# Patient Record
Sex: FEMALE | Race: WHITE | HISPANIC OR LATINO | Employment: FULL TIME | URBAN - METROPOLITAN AREA
[De-identification: names, ages, dates, MRNs, and addresses within clinical notes are randomized per-mention and may not be internally consistent; named-entity substitution may affect disease eponyms.]

---

## 2017-10-04 ENCOUNTER — CONVERSION ENCOUNTER (OUTPATIENT)
Dept: URGENT CARE | Facility: CLINIC | Age: 37
End: 2017-10-04

## 2018-03-06 ENCOUNTER — OFFICE VISIT (OUTPATIENT)
Dept: OBSTETRICS AND GYNECOLOGY | Facility: CLINIC | Age: 38
End: 2018-03-06

## 2018-03-06 VITALS
HEART RATE: 90 BPM | WEIGHT: 126 LBS | HEIGHT: 61 IN | SYSTOLIC BLOOD PRESSURE: 122 MMHG | BODY MASS INDEX: 23.79 KG/M2 | DIASTOLIC BLOOD PRESSURE: 74 MMHG

## 2018-03-06 DIAGNOSIS — Z01.419 ENCOUNTER FOR GYNECOLOGICAL EXAMINATION WITHOUT ABNORMAL FINDING: Primary | ICD-10-CM

## 2018-03-06 DIAGNOSIS — Z30.432 ENCOUNTER FOR IUD REMOVAL: ICD-10-CM

## 2018-03-06 PROCEDURE — 58301 REMOVE INTRAUTERINE DEVICE: CPT | Performed by: OBSTETRICS & GYNECOLOGY

## 2018-03-06 PROCEDURE — 99385 PREV VISIT NEW AGE 18-39: CPT | Performed by: OBSTETRICS & GYNECOLOGY

## 2018-03-06 NOTE — PROGRESS NOTES
"GYN Annual Exam     CC- Here for annual exam.     Stephany Giles is a 37 y.o. female who presents for annual well woman exam. Periods are irregular due to IUD. Pt would like to have IUD removed. Pt is having spotting or bleeding each day of the month.    OB History      Para Term  AB Living    2 2    2    SAB TAB Ectopic Multiple Live Births                  Current contraception: IUD  History of abnormal Pap smear: no  Family history of uterine, colon or ovarian cancer: no  History of abnormal mammogram: no  Family history of breast cancer: no  Last Pap : 3 yrs ago    History reviewed. No pertinent past medical history.    Past Surgical History:   Procedure Laterality Date   •  SECTION         No current outpatient prescriptions on file.    No Known Allergies    Social History   Substance Use Topics   • Smoking status: Never Smoker   • Smokeless tobacco: Never Used   • Alcohol use Yes       Family History   Problem Relation Age of Onset   • Uterine cancer Cousin    • Breast cancer Neg Hx    • Ovarian cancer Neg Hx    • Colon cancer Neg Hx    • Deep vein thrombosis Neg Hx    • Pulmonary embolism Neg Hx        Review of Systems   Constitutional: Negative for chills and fever.   Gastrointestinal: Negative for abdominal pain.   Genitourinary: Negative for dysuria, pelvic pain, vaginal bleeding and vaginal discharge.   All other systems reviewed and are negative.      /74  Pulse 90  Ht 154.9 cm (61\")  Wt 57.2 kg (126 lb)  Breastfeeding? No  BMI 23.81 kg/m2    Physical Exam   Constitutional: She is oriented to person, place, and time. She appears well-developed and well-nourished. No distress.   HENT:   Head: Normocephalic and atraumatic.   Eyes: Conjunctivae are normal.   Neck: Normal range of motion. Neck supple. No thyromegaly present.   Cardiovascular: Normal rate and regular rhythm.    No murmur heard.  Pulmonary/Chest: Effort normal and breath sounds normal. Right breast " exhibits no inverted nipple, no mass and no nipple discharge. Left breast exhibits no inverted nipple, no mass and no nipple discharge.   Abdominal: Soft. Bowel sounds are normal. She exhibits no distension. There is no tenderness.   Genitourinary: Vagina normal and uterus normal. Pelvic exam was performed with patient supine. There is no lesion on the right labia. There is no lesion on the left labia. Uterus is not enlarged, not fixed and not tender. Cervix exhibits no motion tenderness (iud strings in place ). Right adnexum displays no mass and no tenderness. Left adnexum displays no mass and no tenderness. No bleeding in the vagina. No vaginal discharge found.   Musculoskeletal: She exhibits no edema.   Lymphadenopathy:        Right: No inguinal adenopathy present.        Left: No inguinal adenopathy present.   Neurological: She is alert and oriented to person, place, and time.   Skin: No rash noted.   Psychiatric: She has a normal mood and affect. Her behavior is normal.     IUD Removal Procedure Note    Type of IUD:  Mirena  Date of insertion:  2014  Reason for removal:  spotting and  is s/p vasectomy   Other relevant history/information:  none    Procedure Time Out Documentation      Procedure Details  IUD strings visible:  yes  Local anesthesia:  None  Tenaculum used:  None  Removal:  IUD strings grasped and IUD removed intact with gentle traction.  The patient tolerated the procedure well.    All appropriate instructions regarding removal were reviewed.    Tolerated well  No apparent complications  Post procedure diagnosis : IUD removal     Plans for contraception:  vasectomy    Other follow-up needed:  none    The patient was advised to call for any fever or for prolonged or severe pain or bleeding. She was advised to use NSAID as needed for mild to moderate pain.          Assessment     1) GYN annual well woman exam.   2) IUD removal desired, see above     Plan     1) Breast Health - Clinical breast  exam yearly, Self breast awareness monthly  2) Pap - updated today   3) Smoking status- non-smoker   4) Seat belts recommended  5) Follow up prn and one year.     Mc Mas MD   3/6/2018  2:04 PM

## 2018-03-09 LAB
CYTOLOGIST CVX/VAG CYTO: NORMAL
CYTOLOGY CVX/VAG DOC THIN PREP: NORMAL
DX ICD CODE: NORMAL
HIV 1 & 2 AB SER-IMP: NORMAL
HPV I/H RISK 1 DNA CVX QL PROBE+SIG AMP: NEGATIVE
OTHER STN SPEC: NORMAL
PATH REPORT.FINAL DX SPEC: NORMAL
STAT OF ADQ CVX/VAG CYTO-IMP: NORMAL

## 2019-06-04 VITALS
HEART RATE: 95 BPM | BODY MASS INDEX: 21.14 KG/M2 | HEIGHT: 61 IN | DIASTOLIC BLOOD PRESSURE: 81 MMHG | WEIGHT: 112 LBS | RESPIRATION RATE: 17 BRPM | OXYGEN SATURATION: 97 % | SYSTOLIC BLOOD PRESSURE: 121 MMHG